# Patient Record
(demographics unavailable — no encounter records)

---

## 2025-03-06 NOTE — PLAN
[FreeTextEntry1] : Overweight/Obesity- BMI 35. Pt has tried to lose weight with no success; she has tried Weight Watchers and has been on an exercise program. She tried ozempic last year and it did not help, in fact she has gained more weight.  Will start Zepbound 2.5 mg weekly and titrate up as needed.

## 2025-03-06 NOTE — HISTORY OF PRESENT ILLNESS
[FreeTextEntry1] : Pt here for follow up. Pt was on ozempic for a few months and didn't feel that it helped with her appetite. Did not titrate up dose. Has gained weight.